# Patient Record
Sex: FEMALE | Race: WHITE | Employment: OTHER | URBAN - METROPOLITAN AREA
[De-identification: names, ages, dates, MRNs, and addresses within clinical notes are randomized per-mention and may not be internally consistent; named-entity substitution may affect disease eponyms.]

---

## 2017-04-10 ENCOUNTER — HOSPITAL ENCOUNTER (OUTPATIENT)
Dept: PHYSICAL THERAPY | Age: 72
Discharge: HOME | End: 2017-04-10
Payer: MEDICARE

## 2017-04-10 PROCEDURE — G8978 MOBILITY CURRENT STATUS: HCPCS

## 2017-04-10 PROCEDURE — 97162 PT EVAL MOD COMPLEX 30 MIN: CPT

## 2017-04-10 PROCEDURE — 97112 NEUROMUSCULAR REEDUCATION: CPT

## 2017-04-10 PROCEDURE — G8979 MOBILITY GOAL STATUS: HCPCS

## 2017-04-10 PROCEDURE — 97530 THERAPEUTIC ACTIVITIES: CPT

## 2017-04-10 NOTE — PROGRESS NOTES
PT DAILY TREATMENT NOTE/VESTIBULAR EVAL 3-16    Patient Name: Eula Escobar  SJQH:  : 1945  [x]  Patient  Verified  Payor: Lorena Owens / Plan: [place] MEDIC[place] PART A & B / Product Type: Medicare /    In time:1045  Out time:1145  Total Treatment Time (min): 60  Total Timed Codes (min): 60  1:1 Treatment Time ( [address] only): 60   Visit #: 1 of 12    Treatment Area: Unstable balance [R26.89]    SUBJECTIVE  Pain Level (0-10 scale): 0, dizziness 0  []constant []intermittent []improving []worsening []no change since onset    Any medication changes, allergies to medications, adverse drug reactions, diagnosis change, or new procedure performed?: [x] No    [] Yes (see summary sheet for update)  Subjective functional status/changes: patient reports recent episode of noise accident as she was visiting the [employer] [place]. Loud noise during performance to imitate thunder caused acute hearing loss right >left. She has been on steroids for one week now and slowly hearing is improving some. She reports since recent incident exacerbation of balance deficit limiting her overall ambulation with ADL since she is afraid of falling. She has had balance issues and occasional dizziness for 3 years. Recent diagnosis of small brain tumor behind right ear- being monitored.     PLOF: Full function with intermittent episodes of dizziness and balance loss  Limitations to PLOF: increased difficulty with ambulation, needs SC for safety  Mechanism of Injury: gradual onset  Current symptoms/Complaints: progressive dizziness since recent acute hearing loss  Previous Treatment/Compliance: n/a  PMHx/Surgical Hx: n/a  Work Hx: recent shelter as clinical psychologist  Living Situation: lives with , able to perform housekeeping/shopping  Pt Goals: improve balance  Barriers: []pain []financial []time []transportation []other  Motivation: excellent  Substance use: []Alcohol []Tobacco []other:   FABQ Score: [x]low []elevate  Cognition: A & O x 3    Other:    OBJECTIVE/EXAMINATION  Domestic Life: WNL  Activity/Recreational Limitations: n/a  Mobility: limited due to balance deficit  Self Care: WNL        25 min [x]Eval                  []Re-Eval           25 min Therapeutic Activity:  [x]  See flow sheet :balance activities, instruction in HEP and POC   Rationale: improve balance  to improve the patients ability to return to full function     10 min Neuromuscular Re-education:  [x]  See flow sheet :instruction in vestibular ex including VSE to be performed in sitting   Rationale: improve balance  to improve the patients ability to return to full function              With   [] TE   [] TA   [] neuro   [] other: Patient Education: [x] Review HEP    [] Progressed/Changed HEP based on:   [] positioning   [] body mechanics   [] transfers   [] heat/ice application    [] other:      Other Objective/Functional Measures: as per evaluation    Physical Therapy Evaluation - Vestibular    Posture:  [] WNL      [x] Forward head    [] Protracted shoulders    [] Retracted shoulders  [] Kyphosis:  [x] increased   [] decreased   [] Lordosis:   [] increased   [] decreased  Other:    C/S ROM: [] WFL    [x] Limited    Describe:bishnu SB 30 deg each, and Rot right 40, left 30, no pain    Strength: [x] WFL    [] Limited    Describe:    Optional Tests:  Sensation:  [x] Intact [] Diminished    Describe:    Proprioception: [x] Intact [] Diminished    Describe:    Coordination Testing:       Disdiadochokinesia [x] WFL    [] Impaired    Describe:       Heel - Mejia  [] WFL    [] Impaired    Describe:       FNF   [] WFL    [x] Impaired    Describe:slight impairment       Toe Tap   [x] WFL    [] Impaired    Describe:    Oculomotor Tests: (Fixation Not Blocked)       Ocular ROM:   [x] WFL    [] Limited    Describe:       Spontaneous Nystag. [x] Neg     [] Pos    [] Left    [] Right       Gaze Holding Nystag.  [x] Neg     [] Pos    [] Left    [] Right        Smooth Pursuit  [x] Neg [] Pos    [] Left    [] Right        Saccades   [x] Neg     [] Pos    [] Left    [] Right        VOR - Slow Head Mvmt [x] Neg     [] Pos    [] Left    [] Right        VOR - Fast Head Mvmt [] Neg     [x] Pos    [] Left    [] Right no dizziness, difficulty maintaining gaze stability       Head Thrust  [x] Neg     [] Pos    [] Left    [] Right        Static Visual Acuity [x] Neg     [] Pos    [] Left    [] Right        Dynamic Visual Acuity [] Neg     [x] Pos    [] Left    [] Right     Oculomotor Tests: (Fixation Blocked)       Spontaneous Nystag. [x] Neg     [] Pos    [] Left    [] Right       Gaze Holding Nystag. [x] Neg     [] Pos    [] Left    [] Right        Head-Shaking Nystag. [x] Neg     [] Pos    [] Left    [] Right    Other Special Tests:       Vertebral Artery Testing [] Neg     [] Pos    [] Left    [] Right, NT due to limited CS ROM       Hallpike-O'Brien Maneuver [x] Neg     [] Pos    [x] Left    [x] Right       Roll Test   [] Neg     [] Pos    [] Left    [] Right       [place] Balance Scale [] Neg     [] Pos    Score:       Dynamic Gait Index [] Neg     [] Pos    Score:       Functional Gait Assess. [] Neg     [x] Pos    Score:17/30    Balance Standard Testing (Eyes Open/Eyes Closed - EO/EC)       Romberg   [x] WFL    [] Pos    Describe:           Stand on Foam  [] WFL    [x] Pos    Describe: Mild balance deficit       Standing on Rail  [] WFL    [] Pos    Describe: NT       Sharpened Romberg [] WFL    [x] Pos    Describe: Unable to maintain SR without external support, able to hold MSR for >5 seconds       Single Leg Stand  [] WFL    [x] Pos    Describe:limited to 2\" each     Motion Sensitivity:  [] Neg     [] Pos    Describe:    Computerized Dynamic Posturography:        [x] Not Tested    [] WFL    Score:     Other Tests:         Pain Level (0-10 scale) post treatment: 0    ASSESSMENT/Changes in Function: good understanding of POC and HEP    Patient will continue to benefit from skilled PT services to address imbalance/dizziness to attain remaining goals.      [x]  See Plan of Care  []  See progress note/recertification  []  See Discharge Summary         Progress towards goals / Updated goals:  Good understanding of HEP, challenged with balance activities including SR stance and ambulation    PLAN  []  Upgrade activities as tolerated     [x]  Continue plan of care  []  Update interventions per flow sheet       []  Discharge due to:_  []  Other:_      Breanna Holloway, PT 4/10/2017  11:16 AM

## 2017-04-10 NOTE — PROGRESS NOTES
[facility] in [address] [place], [facility][note 3] [address]  Phone: [phone]      Fax:  [phone] of Care/ Statement of Necessity for Physical Therapy Services    Patient name: Moises Malin Start of Care: 4/10/2017   Referral source: Cecilia Polk MD : 1945    Medical Diagnosis: Unstable balance [R26.89]   Onset Date:end 3/2017    Treatment Diagnosis: balance deficit   Prior Hospitalization: see medical history Provider#: [id number]   Medications: Verified on Patient summary List    Comorbidities: pulmonary hypertension, repair 2 heart valves , TKA , CA surgeries , brain tumor, kidney disease   Prior Level of Function: Full function with occasional dizziness for 3 years      The Plan of Care and following information is based on the information from the initial evaluation. 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 Patient is required to use SC due to balance deficit. Objective findings include mild deficit in gaze stability, negative [place] Hallpike maneuver, deficit in ambulatory balance (FGA 17/30), restricted CS mobility,occasional dizziness with ADL ([address] [place] [address] 56 points) and overall limited function (FOTO 51/100). Patient is highly motivated and a good candidate for skilled PT to address deficits. Evaluation Complexity History HIGH Complexity :3+ comorbidities / personal factors will impact the outcome/ POC ; Examination MEDIUM Complexity : 3 Standardized tests and measures addressing body structure, function, activity limitation and / or participation in recreation  ;Presentation MEDIUM Complexity : Evolving with changing characteristics  ; Clinical Decision Making MEDIUM Complexity : FOTO score of 26-74  Overall Complexity Rating: MEDIUM  Problem List: impaired gait/ balance, decrease ADL/ functional abilitiies, decrease activity tolerance and decrease flexibility/ joint mobility   Treatment Plan may include any combination of the following: Therapeutic exercise, Therapeutic activities, Neuromuscular re-education, Physical agent/modality, Gait/balance training, Manual therapy and Patient education  Patient / Family readiness to learn indicated by: asking questions, trying to perform skills and interest  Persons(s) to be included in education: patient (P)  Barriers to Learning/Limitations: None  Patient Goal (s): improved balance  Patient Self Reported Health Status: fair  Rehabilitation Potential: good    Short Term Goals: To be accomplished in 3 weeks:   1. Improved CS mobility  To >or= 40 deg in all planes for increased ease with dynamic ADL and balancing  Status at [facility]:limited in Ext (30), SB (30) and Rot to left (30)    2. Ability to perform SLS for >or= 3 seconds for increased ease with negotiation of curb or steps  Status at [facility]: SLS max 2 seconds, imbalance        Long Term Goals: To be accomplished in 6 weeks:   1. Improved FGA score to >or= 20/30 as evidence of improved ambulatory balance to allow return to community ambulation  Status at [facility]: FGA 17/30, limited ambulation due to fear of falling    2. Reduction in [address] to >or= 30 points as evidence of improved balance during ADL  Status at [facility]:DHI 56/100    3. Improved postural awareness to allow self correction during ADL for better alingment  Status at [facility]: marked FHP in sitting and deficit in ability to self correct      Frequency / Duration: Patient to be seen 2 times per week for 6 weeks.     Patient/ Caregiver education and instruction: Diagnosis, prognosis, activity modification and exercises   [x]  Plan of care has been reviewed with PTA    G-Codes (GP)  Mobility   Current  CK= 40-59%   Goal  CJ= 20-39%  Position    Carry    Self Care      The severity rating is based on clinical judgment and the FOTO score. Certification Period: 4/10/17-6/9/17  Jeanna Richter, PT 4/10/2017 11:03 AM  _____________________________________________________________________  I certify that the above Therapy Services are being furnished while the patient is under my care. I agree with the treatment plan and certify that this therapy is necessary. Physician's Signature:____________________  Date:__________Time:______    Please sign and return to   [facility] in [address]  [place] [address]     Phone: [phone]      Fax:  [phone]

## 2017-04-13 ENCOUNTER — HOSPITAL ENCOUNTER (OUTPATIENT)
Dept: PHYSICAL THERAPY | Age: 72
Discharge: HOME | End: 2017-04-13
Payer: MEDICARE

## 2017-04-13 PROCEDURE — 97112 NEUROMUSCULAR REEDUCATION: CPT

## 2017-04-13 PROCEDURE — 97530 THERAPEUTIC ACTIVITIES: CPT

## 2017-04-13 NOTE — PROGRESS NOTES
PT DAILY TREATMENT NOTE - [facility]     Patient Name: Justine Fischer  Date:2017  : 1945  [x]  Patient  Verified  Payor: Royer Mancini / Plan: [place] MEDICARE PART A & B / Product Type: Medicare /    In time:11  Out time:1145  Total Treatment Time (min): 45  Total Timed Codes (min): 45  1:1 Treatment Time ( [address] only): 39   Visit #: 2 of 12    Treatment Area: Unstable balance [R26.89]    SUBJECTIVE  Pain Level (0-10 scale): 0, no dizziness  Any medication changes, allergies to medications, adverse drug reactions, diagnosis change, or new procedure performed?: [x] No    [] Yes (see summary sheet for update)  Subjective functional status/changes:   [x] No changes reported  Reports difficulty with HR/TR ex and frequent LOB backwards, otherwise no difficulty with HeP    OBJECTIVE    Modality rationale:    Min Type Additional Details    [] Estim:  []Unatt       []IFC  []Premod                        []Other:  []w/ice   []w/heat  Position:  Location:    [] Estim: []Att    []TENS instruct  []NMES                    []Other:  []w/US   []w/ice   []w/heat  Position:  Location:    []  Traction: [] Cervical       []Lumbar                       [] Prone          []Supine                       []Intermittent   []Continuous Lbs:  [] before manual  [] after manual    []  Ultrasound: []Continuous   [] Pulsed                           []1MHz   []3MHz W/cm2:  Location:    []  Iontophoresis with dexamethasone         Location: [] Take home patch   [] In clinic    []  Ice     []  heat  []  Ice massage  []  Laser   []  Anodyne Position:  Location:    []  Laser with stim  []  Other:  Position:  Location:    []  Vasopneumatic Device Pressure:       [] lo [] med [] hi   Temperature: [] lo [] med [] hi   [] Skin assessment post-treatment:  []intact []redness- no adverse reaction    []redness  adverse reaction:      min []Eval                  []Re-Eval        min Therapeutic Exercise:  [] See flow sheet :       30 min Therapeutic Activity:  [x]  See flow sheet :static and dynamic balance activities, updated HEP, initiated activities with foam roller and balance board   Rationale: improve balance  to improve the patients ability to return to PLOF     15 min Neuromuscular Re-education:  [x]  See flow sheet :VSE and VVI, further instruction about proper performance of above ex in updated postures, proprioceptive training and facilitation of balance reactions   Rationale: improve balance  to improve the patients ability to return to PLOF              With   [] TE   [x] TA   [] neuro   [] other: Patient Education: [x] Review HEP    [x] Progressed/Changed HEP based on: current performance  [] positioning   [] body mechanics   [] transfers   [] heat/ice application    [] other:      Other Objective/Functional Measures: occasional LOB with toe raise in standing, SLS max 2 seconds, good performance with balance board,  Mild LE fatigue with standing /stepping activities , unable to perform foam roller ex on floor due to bilateral knee pain     Pain Level (0-10 scale) post treatment: 0    ASSESSMENT/Changes in Function: highly motivated, good tolerance to all activities    Patient will continue to benefit from skilled PT services to assess and modify postural abnormalities and address imbalance/dizziness to attain remaining goals. [x]  See Plan of Care  []  See progress note/recertification  []  See Discharge Summary         Progress towards goals / Updated goals:patient is compliant with HEP    Short Term Goals: To be accomplished in 3 weeks:  1. Improved CS mobility To >or= 40 deg in all planes for increased ease with dynamic ADL and balancing  Status at Eval:limited in Ext (30), SB (30) and Rot to left (30)     2. Ability to perform SLS for >or= 3 seconds for increased ease with negotiation of curb or steps  Status at Eval: SLS max 2 seconds, imbalance           Long Term Goals: To be accomplished in 6 weeks:  1.  Improved FGA score to >or= 20/30 as evidence of improved ambulatory balance to allow return to community ambulation  Status at [facility]: FGA 17/30, limited ambulation due to fear of falling     2. Reduction in [address] to >or= 30 points as evidence of improved balance during ADL  Status at Eval:DHI 56/100     3.  Improved postural awareness to allow self correction during ADL for better alingment  Status at Eval: marked FHP in sitting and deficit in ability to self correct     PLAN  []  Upgrade activities as tolerated     [x]  Continue plan of care, initiate MT to  for increased ease with functional mobility  []  Update interventions per flow sheet       []  Discharge due to:_  []  Other:_      Gloria Lemus PT 4/13/2017  12:05 PM    Future Appointments  Date Time Provider Vipin Sherwood   4/17/2017 9:30 AM Gloria Lemus, PT MARIO ALBERTO [facility]   4/20/2017 4:00 PM MASSIMO Awad [facility]   4/24/2017 10:00 AM MASSIMO Awad [facility]   4/27/2017 3:00 PM Fela Camarillo, MASSIMO LLANES [facility]

## 2017-04-17 ENCOUNTER — HOSPITAL ENCOUNTER (OUTPATIENT)
Dept: PHYSICAL THERAPY | Age: 72
Discharge: HOME | End: 2017-04-17
Payer: MEDICARE

## 2017-04-17 PROCEDURE — 97112 NEUROMUSCULAR REEDUCATION: CPT

## 2017-04-17 PROCEDURE — 97530 THERAPEUTIC ACTIVITIES: CPT

## 2017-04-17 PROCEDURE — 97140 MANUAL THERAPY 1/> REGIONS: CPT

## 2017-04-17 NOTE — PROGRESS NOTES
PT DAILY TREATMENT NOTE - [facility]     Patient Name: Brandi Bazan  Date:2017  : 1945  [x]  Patient  Verified  Payor: [place] MEDICARE / Plan: [place] MEDICARE PART A & B / Product Type: Medicare /    In time:940  Out time:1020  Total Treatment Time (min): 40  Total Timed Codes (min): 40  1:1 Treatment Time ([facility] only): 40   Visit #: 3 of 12    Treatment Area: Unstable balance [R26.89]    SUBJECTIVE  Pain Level (0-10 scale): 0, no dizziness or pain  Any medication changes, allergies to medications, adverse drug reactions, diagnosis change, or new procedure performed?: [x] No    [] Yes (see summary sheet for update)  Subjective functional status/changes:   [x] No changes reported  Compliant with HEP    OBJECTIVE    Modality rationale:    Min Type Additional Details    [] Estim:  []Unatt       []IFC  []Premod                        []Other:  []w/ice   []w/heat  Position:  Location:    [] Estim: []Att    []TENS instruct  []NMES                    []Other:  []w/US   []w/ice   []w/heat  Position:  Location:    []  Traction: [] Cervical       []Lumbar                       [] Prone          []Supine                       []Intermittent   []Continuous Lbs:  [] before manual  [] after manual    []  Ultrasound: []Continuous   [] Pulsed                           []1MHz   []3MHz W/cm2:  Location:    []  Iontophoresis with dexamethasone         Location: [] Take home patch   [] In clinic    []  Ice     []  heat  []  Ice massage  []  Laser   []  Anodyne Position:  Location:    []  Laser with stim  []  Other:  Position:  Location:    []  Vasopneumatic Device Pressure:       [] lo [] med [] hi   Temperature: [] lo [] med [] hi   [] Skin assessment post-treatment:  []intact []redness- no adverse reaction    []redness  adverse reaction:      min []Eval                  []Re-Eval        min Therapeutic Exercise:  [] See flow sheet :       20 min Therapeutic Activity:  [x]  See flow sheet :static and dynamic balance activities, updated HEP, initiated activities with foam roller and balance board   Rationale: improve balance  to improve the patients ability to return to PLOF     10 min Neuromuscular Re-education:  [x]  See flow sheet :VSE and VVI, further instruction about proper performance of above ex in updated postures, proprioceptive training and facilitation of balance reactions   Rationale: improve balance  to improve the patients ability to return to PLOF    10 min Manual Therapy including functional massage to CS for restricted Rot /SB              With   [] TE   [x] TA   [] neuro   [] other: Patient Education: [x] Review HEP    [x] Progressed/Changed HEP based on: current performance  [] positioning   [] body mechanics   [] transfers   [] heat/ice application    [] other:      Other Objective/Functional Measures:   Good tolerance to TA/NM, no increase in dizziness  Increased ease with CS left Rot after MT       Pain Level (0-10 scale) post treatment: 0    ASSESSMENT/Changes in Function: highly motivated, good tolerance to all activities    Patient will continue to benefit from skilled PT services to assess and modify postural abnormalities and address imbalance/dizziness to attain remaining goals. [x]  See Plan of Care  []  See progress note/recertification  []  See Discharge Summary         Progress towards goals / Updated goals:    Short Term Goals: To be accomplished in 3 weeks:  1. Improved CS mobility To >or= 40 deg in all planes for increased ease with dynamic ADL and balancing  Status at Eval:limited in Ext (30), SB (30) and Rot to left (30)  Current status: improved mobility after MT for bishnu left CS rotation, progressing     2. Ability to perform SLS for >or= 3 seconds for increased ease with negotiation of curb or steps  Status at Eval: SLS max 2 seconds, imbalance           Long Term Goals: To be accomplished in 6 weeks:  1.  Improved FGA score to >or= 20/30 as evidence of improved ambulatory balance to allow return to community ambulation  Status at [facility]: FGA 17/30, limited ambulation due to fear of falling     2. Reduction in [address] to >or= 30 points as evidence of improved balance during ADL  Status at [facility]:DHI 56/100     3.  Improved postural awareness to allow self correction during ADL for better alingment  Status at [facility]: marked FHP in sitting and deficit in ability to self correct  Current: improved postural awareness, progressing     PLAN  []  Upgrade activities as tolerated     [x]  Continue plan of care, review tolerance to MT/CS  []  Update interventions per flow sheet       []  Discharge due to:_  []  Other:_      Armani Gómez PT 4/17/2017  12:05 PM    Future Appointments  Date Time Provider Vipin Sherwood   4/20/2017 4:00 PM Armani Gómez [facility] MARIO ALBERTO [facility]   4/24/2017 10:00 AM MASSIMO Muñoz [facility]   4/27/2017 3:00 PM MASSIMO Muñoz [facility]

## 2017-04-20 ENCOUNTER — HOSPITAL ENCOUNTER (OUTPATIENT)
Dept: PHYSICAL THERAPY | Age: 72
Discharge: HOME | End: 2017-04-20
Payer: MEDICARE

## 2017-04-20 PROCEDURE — 97112 NEUROMUSCULAR REEDUCATION: CPT

## 2017-04-20 PROCEDURE — 97530 THERAPEUTIC ACTIVITIES: CPT

## 2017-04-20 PROCEDURE — 97110 THERAPEUTIC EXERCISES: CPT

## 2017-04-20 NOTE — PROGRESS NOTES
PT DAILY TREATMENT NOTE - [facility]     Patient Name: Arvin Granger  Date:2017  : 1945  [x]  Patient  Verified  Payor: [place] MEDICARE / Plan: [place] MEDICARE PART A & B / Product Type: Medicare /    In time:1005  Out time:1045  Total Treatment Time (min): 40  Total Timed Codes (min): 40  1:1 Treatment Time ( [address] only): 40   Visit #: 4 of 12    Treatment Area: Unstable balance [R26.89]    SUBJECTIVE  Pain Level (0-10 scale): 0, no dizziness or pain  Any medication changes, allergies to medications, adverse drug reactions, diagnosis change, or new procedure performed?: [x] No    [] Yes (see summary sheet for update)  Subjective functional status/changes:   [x] No changes reported  Difficulty with VVI coordination, SLS and tandem activities  Flareup of overall symptoms including LE weakness and balance.     OBJECTIVE    Modality rationale:    Min Type Additional Details    [] Estim:  []Unatt       []IFC  []Premod                        []Other:  []w/ice   []w/heat  Position:  Location:    [] Estim: []Att    []TENS instruct  []NMES                    []Other:  []w/US   []w/ice   []w/heat  Position:  Location:    []  Traction: [] Cervical       []Lumbar                       [] Prone          []Supine                       []Intermittent   []Continuous Lbs:  [] before manual  [] after manual    []  Ultrasound: []Continuous   [] Pulsed                           []1MHz   []3MHz W/cm2:  Location:    []  Iontophoresis with dexamethasone         Location: [] Take home patch   [] In clinic    []  Ice     []  heat  []  Ice massage  []  Laser   []  Anodyne Position:  Location:    []  Laser with stim  []  Other:  Position:  Location:    []  Vasopneumatic Device Pressure:       [] lo [] med [] hi   Temperature: [] lo [] med [] hi   [] Skin assessment post-treatment:  []intact []redness- no adverse reaction    []redness  adverse reaction:      min []Eval                  []Re-Eval        min Therapeutic Exercise:  [] See flow sheet :       25 min Therapeutic Activity:  [x]  See flow sheet :static and dynamic balance activities, updated HEP, modified form roller ex in standing  And initiated chest press for postural CS alignment and strengthening   Rationale: improve balance  to improve the patients ability to return to PLOF     15 min Neuromuscular Re-education:  [x]  See flow sheet :VSE and VVI in advanced MSR position, initiated Cawthron Cooksey Ex   Rationale: improve balance  to improve the patients ability to return to PLOF                With   [] TE   [x] TA   [] neuro   [] other: Patient Education: [x] Review HEP    [x] Progressed/Changed HEP based on: current performance  [] positioning   [] body mechanics   [] transfers   [] heat/ice application    [] other:      Other Objective/Functional Measures:   Good tolerance to TA/NM, no increase in dizziness  Increased ease with CS left Rot after MT  Started [employer] Ex first portion without onset of increase in dizziness  Unable to maintain good CS alignment in standing with [place] ex, improved performance with chest press but fatigue       Pain Level (0-10 scale) post treatment: 0    ASSESSMENT/Changes in Function: highly motivated, good tolerance to all activities    Patient will continue to benefit from skilled PT services to assess and modify postural abnormalities and address imbalance/dizziness to attain remaining goals. [x]  See Plan of Care  []  See progress note/recertification  []  See Discharge Summary         Progress towards goals / Updated goals:    Short Term Goals: To be accomplished in 3 weeks:  1. Improved CS mobility To >or= 40 deg in all planes for increased ease with dynamic ADL and balancing  Status at [facility]:limited in Ext (30), SB (30) and Rot to left (30)  Current status: improved mobility after MT for bishnu left CS rotation, progressing     2.  Ability to perform SLS for >or= 3 seconds for increased ease with negotiation of curb or steps  Status at [facility]: SLS max 2 seconds, imbalance  Current status: difficulty with all SLS/tandem activities, SLS 2-3 seconds, progressing           Long Term Goals: To be accomplished in 6 weeks:  1. Improved FGA score to >or= 20/30 as evidence of improved ambulatory balance to allow return to community ambulation  Status at [facility]: FGA 17/30, limited ambulation due to fear of falling  Current status:  retest on visit #8     2.  Reduction in [address] to >or= 30 points as evidence of improved balance during ADL  Status at [facility]:DHI 56/100  Current status: to be tested [place]     3. Improved postural awareness to allow self correction during ADL for better alingment  Status at [facility]: marked FHP in sitting and deficit in ability to self correct  Current: improved postural awareness, progressing     PLAN  []  Upgrade activities as tolerated     [x]  Continue plan of care, review tolerance to MT/CS  []  Update interventions per flow sheet       []  Discharge due to:_  []  Other:_      Charla Cole, MASSIMO 4/20/2017  12:05 PM    Future Appointments  Date Time Provider Vipin Sherwood   4/24/2017 10:00 AM Charla Cole, PT [facility]ERT[facility]   4/27/2017 3:00 PM MASSIMO Sen [facility]

## 2017-04-24 ENCOUNTER — HOSPITAL ENCOUNTER (OUTPATIENT)
Dept: PHYSICAL THERAPY | Age: 72
Discharge: HOME | End: 2017-04-24
Payer: MEDICARE

## 2017-04-24 PROCEDURE — 97530 THERAPEUTIC ACTIVITIES: CPT

## 2017-04-24 PROCEDURE — 97112 NEUROMUSCULAR REEDUCATION: CPT

## 2017-04-24 NOTE — PROGRESS NOTES
PT DAILY TREATMENT NOTE - [facility]     Patient Name: Bryson Mota  Date:2017  : 1945  [x]  Patient  Verified  Payor: Linda Host / Plan: [place] MEDICARE PART A & B / Product Type: Medicare /    In time:1020  Out time:11  Total Treatment Time (min): 40  Total Timed Codes (min): 40  1:1 Treatment Time ( [address] only): 40   Visit #: 5 of 12    Treatment Area: Unstable balance [R26.89]    SUBJECTIVE  Pain Level (0-10 scale): 0, no dizziness or pain  Any medication changes, allergies to medications, adverse drug reactions, diagnosis change, or new procedure performed?: [x] No    [] Yes (see summary sheet for update)  Subjective functional status/changes:   [x] No changes reported  Feeling back to base line, no dizziness at present.     OBJECTIVE    Modality rationale:    Min Type Additional Details    [] Estim:  []Unatt       []IFC  []Premod                        []Other:  []w/ice   []w/heat  Position:  Location:    [] Estim: []Att    []TENS instruct  []NMES                    []Other:  []w/US   []w/ice   []w/heat  Position:  Location:    []  Traction: [] Cervical       []Lumbar                       [] Prone          []Supine                       []Intermittent   []Continuous Lbs:  [] before manual  [] after manual    []  Ultrasound: []Continuous   [] Pulsed                           []1MHz   []3MHz W/cm2:  Location:    []  Iontophoresis with dexamethasone         Location: [] Take home patch   [] In clinic    []  Ice     []  heat  []  Ice massage  []  Laser   []  Anodyne Position:  Location:    []  Laser with stim  []  Other:  Position:  Location:    []  Vasopneumatic Device Pressure:       [] lo [] med [] hi   Temperature: [] lo [] med [] hi   [] Skin assessment post-treatment:  []intact []redness- no adverse reaction    []redness  adverse reaction:      min []Eval                  []Re-Eval        min Therapeutic Exercise:  [] See flow sheet :       25 min Therapeutic Activity:  [x]  See flow sheet :static and dynamic balance activities, updated HEP,,    Rationale: improve balance  to improve the patients ability to return to PLOF     15 min Neuromuscular Re-education:  [x]  See flow sheet :VSE and VVI in advanced MSR position, advanced  Cawthron Cooksey Ex   Rationale: improve balance  to improve the patients ability to return to PLOF                With   [] TE   [x] TA   [] neuro   [] other: Patient Education: [x] Review HEP    [x] Progressed/Changed HEP based on: current performance  [] positioning   [] body mechanics   [] transfers   [] heat/ice application    [] other:      Other Objective/Functional Measures:   Good tolerance to TA/NM, no increase in dizziness  Increased ease with CS left Rot after MT  Advanced Cawthorne Cooksey Ex  without onset of increase in dizziness  Some difficulty to maintain eye focus with VVI  CS ROM: EXt 40, SB ritht 30, left 25, Rot 50, no pain with ROM  SLS left 3 seconds, right 4-5  FOTO 50, DHI 40       Pain Level (0-10 scale) post treatment: 0    ASSESSMENT/Changes in Function: highly motivated, good tolerance to all activities    Patient will continue to benefit from skilled PT services to assess and modify postural abnormalities and address imbalance/dizziness to attain remaining goals. [x]  See Plan of Care  []  See progress note/recertification  []  See Discharge Summary         Progress towards goals / Updated goals:Mrs. Sangita Escalante has been showing improvement in regards to dizziness and balance but continues with residual deficits in static and ambulatory balance. Short Term Goals: To be accomplished in 3 weeks:  1. Improved CS mobility To >or= 40 deg in all planes for increased ease with dynamic ADL and balancing  Status at [facility]:limited in Ext (30), SB (30) and Rot to left (30)  Current status: CS mobility improving with reduction in subjective CS stiffness, Ext 40, SB right 30/left 25, Rot 50, progressing     2.  Ability to perform SLS for >or= 3 seconds for increased ease with negotiation of curb or steps  Status at [facility]: SLS max 2 seconds, imbalance  Current status: improved SLS to 3 sec on left, 4-5 sec on righyt, progressing           Long Term Goals: To be accomplished in 6 weeks:  1. Improved FGA score to >or= 20/30 as evidence of improved ambulatory balance to allow return to community ambulation  Status at [facility]: FGA 17/30, limited ambulation due to fear of falling  Current status:  retest on visit #8     2. Reduction in [address] to >or= 30 points as evidence of improved balance during ADL  Status at [facility]:DHI 56/100  Current status: FOTO score 50 (no progress), DHI score 40 (progressing)      3.  Improved postural awareness to allow self correction during ADL for better alingment  Status at [facility]: marked FHP in sitting and deficit in ability to self correct  Current: improved postural awareness,occasional self correction, progressing     PLAN  []  Upgrade activities as tolerated     [x]  Continue plan of care,   []  Update interventions per flow sheet       []  Discharge due to:_  []  Other:_      Nicolette Mcclendon, PT 4/24/2017  12:05 PM    Future Appointments  Date Time Provider Vipin Sherwood   4/27/2017 3:00 PM Nicolette Mcclendon, PT [facility]

## 2017-04-27 ENCOUNTER — HOSPITAL ENCOUNTER (OUTPATIENT)
Dept: PHYSICAL THERAPY | Age: 72
Discharge: HOME | End: 2017-04-27
Payer: MEDICARE

## 2017-04-27 PROCEDURE — 97530 THERAPEUTIC ACTIVITIES: CPT

## 2017-04-27 PROCEDURE — 97112 NEUROMUSCULAR REEDUCATION: CPT

## 2017-04-27 NOTE — PROGRESS NOTES
PT DAILY TREATMENT NOTE - [facility]     Patient Name: Gabriel Centeno  Date:2017  : 1945  [x]  Patient  Verified  Payor: Vita Hendricks / Plan: [place] MEDICARE PART A & B / Product Type: Medicare /    In time:305  Out time:350  Total Treatment Time (min): 45  Total Timed Codes (min): 45  1:1 Treatment Time ( [address] only): 39  Visit #: 6 of 12    Treatment Area: Unstable balance [R26.89]    SUBJECTIVE  Pain Level (0-10 scale): 0, no dizziness or pain  Any medication changes, allergies to medications, adverse drug reactions, diagnosis change, or new procedure performed?: [x] No    [] Yes (see summary sheet for update)  Subjective functional status/changes:   [x] No changes reported  Reports getting new [employer] shoes though feeling'off' wearing them.  Reports neuropathy in both feet causing numbness    OBJECTIVE    Modality rationale:    Min Type Additional Details    [] Estim:  []Unatt       []IFC  []Premod                        []Other:  []w/ice   []w/heat  Position:  Location:    [] Estim: []Att    []TENS instruct  []NMES                    []Other:  []w/US   []w/ice   []w/heat  Position:  Location:    []  Traction: [] Cervical       []Lumbar                       [] Prone          []Supine                       []Intermittent   []Continuous Lbs:  [] before manual  [] after manual    []  Ultrasound: []Continuous   [] Pulsed                           []1MHz   []3MHz W/cm2:  Location:    []  Iontophoresis with dexamethasone         Location: [] Take home patch   [] In clinic    []  Ice     []  heat  []  Ice massage  []  Laser   []  Anodyne Position:  Location:    []  Laser with stim  []  Other:  Position:  Location:    []  Vasopneumatic Device Pressure:       [] lo [] med [] hi   Temperature: [] lo [] med [] hi   [] Skin assessment post-treatment:  []intact []redness- no adverse reaction    []redness  adverse reaction:      min []Eval                  []Re-Eval        min Therapeutic Exercise:  [] See flow sheet :       30 min Therapeutic Activity:  [x]  See flow sheet :static and dynamic balance activities, updated HEP,,    Rationale: improve balance  to improve the patients ability to return to PLOF     15 min Neuromuscular Re-education:  [x]  See flow sheet :VSE / static and with ambulation, and VVI in advanced MSR position, advanced  Cawthron Cooksey Ex   Rationale: improve balance  to improve the patients ability to return to PLOF                With   [] TE   [x] TA   [] neuro   [] other: Patient Education: [x] Review HEP    [x] Progressed/Changed HEP based on: current performance  [] positioning   [] body mechanics   [] transfers   [] heat/ice application    [] other:      Other Objective/Functional Measures:   Performed multiple ex without shoes and also on  Disc with knobs for improved plantar foot stimulation for improved proprioception and balance       Pain Level (0-10 scale) post treatment: 0    ASSESSMENT/Changes in Function:improved performance with SLS and SR without shoes probably due to increased foot stimulation    Patient will continue to benefit from skilled PT services to assess and modify postural abnormalities and address imbalance/dizziness to attain remaining goals. [x]  See Plan of Care  []  See progress note/recertification  []  See Discharge Summary         Progress towards goals / Updated goals:Mrs. Marline Antoine has been showing improvement in regards to dizziness and balance but continues with residual deficits in static and ambulatory balance. Short Term Goals: To be accomplished in 3 weeks:  1. Improved CS mobility To >or= 40 deg in all planes for increased ease with dynamic ADL and balancing  Status at [facility]:limited in Ext (30), SB (30) and Rot to left (30)  Current status: CS mobility improving with reduction in subjective CS stiffness, Ext 40, SB right 30/left 25, Rot 50, progressing     2.  Ability to perform SLS for >or= 3 seconds for increased ease with negotiation of curb or steps  Status at [facility]: SLS max 2 seconds, imbalance  Current status: improved SLS to 3 sec on left, 4-5 sec on righyt, progressing           Long Term Goals: To be accomplished in 6 weeks:  1. Improved FGA score to >or= 20/30 as evidence of improved ambulatory balance to allow return to community ambulation  Status at [facility]: FGA 17/30, limited ambulation due to fear of falling  Current status:  retest on visit #8     2. Reduction in [address] to >or= 30 points as evidence of improved balance during ADL  Status at [facility]:DHI 56/100  Current status: FOTO score 50 (no progress), DHI score 40 (progressing)      3. Improved postural awareness to allow self correction during ADL for better alingment  Status at [facility]: marked FHP in sitting and deficit in ability to self correct  Current: improved postural awareness,occasional self correction, progressing     PLAN  []  Upgrade activities as tolerated     [x]  Continue plan of care,   []  Update interventions per flow sheet       []  Discharge due to:_  []  Other:_      Jennifer Amador, PT 4/27/2017  12:05 PM    No future appointments.

## 2017-05-10 ENCOUNTER — APPOINTMENT (OUTPATIENT)
Dept: PHYSICAL THERAPY | Age: 72
End: 2017-05-10

## 2017-05-12 ENCOUNTER — APPOINTMENT (OUTPATIENT)
Dept: PHYSICAL THERAPY | Age: 72
End: 2017-05-12

## 2017-05-16 ENCOUNTER — APPOINTMENT (OUTPATIENT)
Dept: PHYSICAL THERAPY | Age: 72
End: 2017-05-16

## 2017-05-19 ENCOUNTER — APPOINTMENT (OUTPATIENT)
Dept: PHYSICAL THERAPY | Age: 72
End: 2017-05-19

## 2017-05-23 ENCOUNTER — APPOINTMENT (OUTPATIENT)
Dept: PHYSICAL THERAPY | Age: 72
End: 2017-05-23

## 2017-05-26 ENCOUNTER — APPOINTMENT (OUTPATIENT)
Dept: PHYSICAL THERAPY | Age: 72
End: 2017-05-26

## 2017-05-30 ENCOUNTER — APPOINTMENT (OUTPATIENT)
Dept: PHYSICAL THERAPY | Age: 72
End: 2017-05-30

## 2017-06-01 ENCOUNTER — APPOINTMENT (OUTPATIENT)
Dept: PHYSICAL THERAPY | Age: 72
End: 2017-06-01

## 2017-06-27 NOTE — PROGRESS NOTES
[facility] in [address] [place], [address] [place]  Phone: [phone]      Fax:  [phone]    Discharge Summary        Patient name: Gabriel Centeno Start of Care: 4/10/2017   Referral source: Yasmany Mejia MD : 1945                         Medical Diagnosis: Unstable balance [R26.89] Onset Date:end 3/2017                         Treatment Diagnosis: balance deficit   Prior Hospitalization: see medical history Provider#: [id number]   Medications: Verified on Patient summary List    Comorbidities: pulmonary hypertension, repair 2 heart valves , TKA , CA surgeries , brain tumor, kidney disease   Prior Level of Function: Full function with occasional dizziness for 3 years    Visits from Start of Care: 6    Missed Visits: 0  Reporting Period : 4/10/17 to 17    Mrs. Susi aGrcia has been showing nice progress in CS mobility / function and below status is based on her last visit on 17. She contacted us on 5/10/17 to cancel all further visits due to recurring cancer . Thank you for the referral.  Short Term Goals: To be accomplished in 3 weeks:  1. Improved CS mobility To >or= 40 deg in all planes for increased ease with dynamic ADL and balancing  Status at [facility]:limited in Ext (30), SB (30) and Rot to left (30)  Current status: CS mobility improving with reduction in subjective CS stiffness, Ext 40, SB right 30/left 25, Rot 50, progressing      2. Ability to perform SLS for >or= 3 seconds for increased ease with negotiation of curb or steps  Status at [facility]: SLS max 2 seconds, imbalance  Current status: improved SLS to 3 sec on left, 4-5 sec on righyt, progressing              Long Term Goals: To be accomplished in 6 weeks:  1. Improved FGA score to >or= 20/30 as evidence of improved ambulatory balance to allow return to community ambulation  Status at [facility]: FGA 17/30, limited ambulation due to fear of falling  Current status:  retest on visit #8      2. Reduction in [address] [facility] [address] to >or= 30 points as evidence of improved balance during ADL  Status at [facility]:DHI 56/100  Current status: FOTO score 50 (no progress), DHI score 40 (progressing)       3. Improved postural awareness to allow self correction during ADL for better alingment  Status at [facility]: marked FHP in sitting and deficit in ability to self correct  Current: improved postural awareness,occasional self correction, progressing    G-Codes (GP)  Mobility    Goal  CJ= 20-39%  D/C  CK= 40-59%        The severity rating is based on clinical judgment and the FOTO score.     Assessment/ Summary of Care: progressing, d/c due to medical problems    RECOMMENDATIONS:  [x]Discontinue therapy: []Patient has reached or is progressing toward set goals      []Patient is non-compliant or has abdicated      []Due to lack of appreciable progress towards set goals    Mika Wynne, PT 6/27/2017 8:19 AM